# Patient Record
Sex: FEMALE | Race: OTHER | URBAN - METROPOLITAN AREA
[De-identification: names, ages, dates, MRNs, and addresses within clinical notes are randomized per-mention and may not be internally consistent; named-entity substitution may affect disease eponyms.]

---

## 2020-06-24 ENCOUNTER — TELEPHONE ENCOUNTER (OUTPATIENT)
Dept: URBAN - METROPOLITAN AREA CLINIC 25 | Facility: CLINIC | Age: 70
End: 2020-06-24

## 2020-07-06 ENCOUNTER — OFFICE VISIT (OUTPATIENT)
Dept: URBAN - METROPOLITAN AREA CLINIC 25 | Facility: CLINIC | Age: 70
End: 2020-07-06

## 2020-07-22 ENCOUNTER — OFFICE VISIT (OUTPATIENT)
Dept: URBAN - METROPOLITAN AREA CLINIC 15 | Facility: CLINIC | Age: 70
End: 2020-07-22

## 2022-07-14 ENCOUNTER — OFFICE VISIT (OUTPATIENT)
Dept: URBAN - METROPOLITAN AREA CLINIC 25 | Facility: CLINIC | Age: 72
End: 2022-07-14

## 2022-07-14 VITALS — HEIGHT: 66 IN

## 2022-07-14 RX ORDER — MAGNESIUM CARB/ALUMINUM HYDROX 105-160MG
TABLET,CHEWABLE ORAL
Qty: 0 | Refills: 0 | Status: ACTIVE | COMMUNITY
Start: 1900-01-01 | End: 1900-01-01

## 2022-07-14 RX ORDER — AMLODIPINE BESYLATE 10 MG/1
TAKE 1 TABLET (10 MG) BY ORAL ROUTE ONCE DAILY TABLET ORAL 1
Qty: 0 | Refills: 0 | Status: ACTIVE | COMMUNITY
Start: 1900-01-01 | End: 1900-01-01

## 2022-07-14 RX ORDER — INSULIN LISPRO 100 [IU]/ML
INJECT BY SUBCUTANEOUS ROUTE PER PRESCRIBER'S INSTRUCTIONS. INSULIN DOSING REQUIRES INDIVIDUALIZATION INJECTION, SOLUTION INTRAVENOUS; SUBCUTANEOUS
Qty: 1 | Refills: 0 | Status: ACTIVE | COMMUNITY
Start: 1900-01-01 | End: 1900-01-01

## 2022-07-29 ENCOUNTER — OFFICE VISIT (OUTPATIENT)
Dept: URBAN - METROPOLITAN AREA TELEHEALTH 2 | Facility: TELEHEALTH | Age: 72
End: 2022-07-29

## 2022-07-29 RX ORDER — AMLODIPINE BESYLATE 10 MG/1
TAKE 1 TABLET (10 MG) BY ORAL ROUTE ONCE DAILY TABLET ORAL 1
Qty: 0 | Refills: 0 | Status: ACTIVE | COMMUNITY
Start: 1900-01-01

## 2022-07-29 RX ORDER — MAGNESIUM CARB/ALUMINUM HYDROX 105-160MG
TABLET,CHEWABLE ORAL
Qty: 0 | Refills: 0 | Status: ACTIVE | COMMUNITY
Start: 1900-01-01

## 2022-07-29 RX ORDER — INSULIN LISPRO 100 [IU]/ML
INJECT BY SUBCUTANEOUS ROUTE PER PRESCRIBER'S INSTRUCTIONS. INSULIN DOSING REQUIRES INDIVIDUALIZATION INJECTION, SOLUTION INTRAVENOUS; SUBCUTANEOUS
Qty: 1 | Refills: 0 | Status: ACTIVE | COMMUNITY
Start: 1900-01-01

## 2022-08-01 ENCOUNTER — OFFICE VISIT (OUTPATIENT)
Dept: URBAN - METROPOLITAN AREA CLINIC 25 | Facility: CLINIC | Age: 72
End: 2022-08-01

## 2022-10-03 ENCOUNTER — OFFICE VISIT (OUTPATIENT)
Dept: URBAN - METROPOLITAN AREA CLINIC 25 | Facility: CLINIC | Age: 72
End: 2022-10-03
Payer: COMMERCIAL

## 2022-10-03 VITALS
HEART RATE: 76 BPM | SYSTOLIC BLOOD PRESSURE: 132 MMHG | WEIGHT: 159 LBS | TEMPERATURE: 98.6 F | BODY MASS INDEX: 25.55 KG/M2 | DIASTOLIC BLOOD PRESSURE: 75 MMHG | HEIGHT: 66 IN

## 2022-10-03 DIAGNOSIS — E11.9 TYPE 2 DIABETES MELLITUS WITHOUT COMPLICATIONS: ICD-10-CM

## 2022-10-03 DIAGNOSIS — I10 HYPERTENSION, UNSPECIFIED TYPE: ICD-10-CM

## 2022-10-03 DIAGNOSIS — Z79.4 LONG TERM (CURRENT) USE OF INSULIN: ICD-10-CM

## 2022-10-03 DIAGNOSIS — R19.7 DIARRHEA, UNSPECIFIED TYPE: ICD-10-CM

## 2022-10-03 DIAGNOSIS — R19.4 CHANGE IN BOWEL HABITS: ICD-10-CM

## 2022-10-03 DIAGNOSIS — K92.1 HEMATOCHEZIA: ICD-10-CM

## 2022-10-03 DIAGNOSIS — R14.0 ABDOMINAL BLOATING: ICD-10-CM

## 2022-10-03 PROBLEM — 313436004: Status: ACTIVE | Noted: 2022-10-03

## 2022-10-03 PROBLEM — 710815001: Status: ACTIVE | Noted: 2022-10-03

## 2022-10-03 PROBLEM — 38341003: Status: ACTIVE | Noted: 2022-10-03

## 2022-10-03 PROCEDURE — 99214 OFFICE O/P EST MOD 30 MIN: CPT | Performed by: INTERNAL MEDICINE

## 2022-10-03 RX ORDER — INSULIN LISPRO 100 [IU]/ML
INJECT BY SUBCUTANEOUS ROUTE PER PRESCRIBER'S INSTRUCTIONS. INSULIN DOSING REQUIRES INDIVIDUALIZATION INJECTION, SOLUTION INTRAVENOUS; SUBCUTANEOUS
Qty: 1 | Refills: 0 | Status: ACTIVE | COMMUNITY
Start: 1900-01-01

## 2022-10-03 RX ORDER — AMLODIPINE BESYLATE 10 MG/1
TAKE 1 TABLET (10 MG) BY ORAL ROUTE ONCE DAILY TABLET ORAL 1
Qty: 0 | Refills: 0 | Status: ACTIVE | COMMUNITY
Start: 1900-01-01

## 2022-10-03 RX ORDER — MESALAMINE 375 MG/1
4 CAPSULES IN THE MORNING CAPSULE, EXTENDED RELEASE ORAL ONCE A DAY
Qty: 360 CAPSULE | Refills: 1 | OUTPATIENT
Start: 2022-10-03 | End: 2023-04-01

## 2022-10-03 RX ORDER — MAGNESIUM CARB/ALUMINUM HYDROX 105-160MG
TABLET,CHEWABLE ORAL
Qty: 0 | Refills: 0 | Status: ACTIVE | COMMUNITY
Start: 1900-01-01

## 2022-10-04 LAB
A/G RATIO: 1.3
ABSOLUTE BASOPHILS: 31
ABSOLUTE EOSINOPHILS: 149
ABSOLUTE LYMPHOCYTES: 2114
ABSOLUTE MONOCYTES: 632
ABSOLUTE NEUTROPHILS: 3274
ALBUMIN: 3.9
ALKALINE PHOSPHATASE: 95
ALT (SGPT): 11
AST (SGOT): 14
BASOPHILS: 0.5
BILIRUBIN, TOTAL: 0.4
BUN/CREATININE RATIO: (no result)
BUN: 9
CALCIUM: 10.1
CARBON DIOXIDE, TOTAL: 23
CHLORIDE: 105
CREATININE: 0.68
EGFR: 92
EOSINOPHILS: 2.4
GLOBULIN, TOTAL: 3
GLUCOSE: 181
HEMATOCRIT: 41.3
HEMOGLOBIN: 13.4
LYMPHOCYTES: 34.1
MCH: 29.3
MCHC: 32.4
MCV: 90.2
MONOCYTES: 10.2
MPV: 10.7
NEUTROPHILS: 52.8
PLATELET COUNT: 286
POTASSIUM: 4
PROTEIN, TOTAL: 6.9
RDW: 12.8
RED BLOOD CELL COUNT: 4.58
SODIUM: 137
WHITE BLOOD CELL COUNT: 6.2

## 2022-10-12 PROBLEM — 62315008: Status: ACTIVE | Noted: 2022-10-12

## 2022-10-12 PROBLEM — 449341000124102: Status: ACTIVE | Noted: 2022-10-12

## 2022-10-12 PROBLEM — 129851009: Status: ACTIVE | Noted: 2022-10-12

## 2022-10-21 ENCOUNTER — OFFICE VISIT (OUTPATIENT)
Dept: URBAN - METROPOLITAN AREA SURGERY CENTER 20 | Facility: SURGERY CENTER | Age: 72
End: 2022-10-21

## 2022-10-25 ENCOUNTER — OFFICE VISIT (OUTPATIENT)
Dept: URBAN - METROPOLITAN AREA SURGERY CENTER 20 | Facility: SURGERY CENTER | Age: 72
End: 2022-10-25
Payer: COMMERCIAL

## 2022-10-25 ENCOUNTER — CLAIMS CREATED FROM THE CLAIM WINDOW (OUTPATIENT)
Dept: URBAN - METROPOLITAN AREA CLINIC 4 | Facility: CLINIC | Age: 72
End: 2022-10-25
Payer: COMMERCIAL

## 2022-10-25 DIAGNOSIS — K51.80 CHRONIC PANCOLONIC ULCERATIVE COLITIS: ICD-10-CM

## 2022-10-25 DIAGNOSIS — K63.89 OTHER SPECIFIED DISEASES OF INTESTINE: ICD-10-CM

## 2022-10-25 PROCEDURE — G8907 PT DOC NO EVENTS ON DISCHARG: HCPCS | Performed by: INTERNAL MEDICINE

## 2022-10-25 PROCEDURE — 45380 COLONOSCOPY AND BIOPSY: CPT | Performed by: INTERNAL MEDICINE

## 2022-10-25 PROCEDURE — 88305 TISSUE EXAM BY PATHOLOGIST: CPT | Performed by: PATHOLOGY

## 2022-10-25 RX ORDER — MAGNESIUM CARB/ALUMINUM HYDROX 105-160MG
TABLET,CHEWABLE ORAL
Qty: 0 | Refills: 0 | Status: ACTIVE | COMMUNITY
Start: 1900-01-01

## 2022-10-25 RX ORDER — AMLODIPINE BESYLATE 10 MG/1
TAKE 1 TABLET (10 MG) BY ORAL ROUTE ONCE DAILY TABLET ORAL 1
Qty: 0 | Refills: 0 | Status: ACTIVE | COMMUNITY
Start: 1900-01-01

## 2022-10-25 RX ORDER — INSULIN LISPRO 100 [IU]/ML
INJECT BY SUBCUTANEOUS ROUTE PER PRESCRIBER'S INSTRUCTIONS. INSULIN DOSING REQUIRES INDIVIDUALIZATION INJECTION, SOLUTION INTRAVENOUS; SUBCUTANEOUS
Qty: 1 | Refills: 0 | Status: ACTIVE | COMMUNITY
Start: 1900-01-01

## 2022-10-25 RX ORDER — MESALAMINE 375 MG/1
4 CAPSULES IN THE MORNING CAPSULE, EXTENDED RELEASE ORAL ONCE A DAY
Qty: 360 CAPSULE | Refills: 1 | Status: ACTIVE | COMMUNITY
Start: 2022-10-03 | End: 2023-04-01

## 2022-10-26 ENCOUNTER — TELEPHONE ENCOUNTER (OUTPATIENT)
Dept: URBAN - METROPOLITAN AREA CLINIC 84 | Facility: CLINIC | Age: 72
End: 2022-10-26

## 2023-07-03 ENCOUNTER — TELEPHONE ENCOUNTER (OUTPATIENT)
Dept: URBAN - METROPOLITAN AREA CLINIC 25 | Facility: CLINIC | Age: 73
End: 2023-07-03

## 2023-07-24 ENCOUNTER — OFFICE VISIT (OUTPATIENT)
Dept: URBAN - METROPOLITAN AREA CLINIC 25 | Facility: CLINIC | Age: 73
End: 2023-07-24
Payer: COMMERCIAL

## 2023-07-24 ENCOUNTER — LAB OUTSIDE AN ENCOUNTER (OUTPATIENT)
Dept: URBAN - METROPOLITAN AREA CLINIC 84 | Facility: CLINIC | Age: 73
End: 2023-07-24

## 2023-07-24 ENCOUNTER — WEB ENCOUNTER (OUTPATIENT)
Dept: URBAN - METROPOLITAN AREA CLINIC 25 | Facility: CLINIC | Age: 73
End: 2023-07-24

## 2023-07-24 VITALS
TEMPERATURE: 98.2 F | SYSTOLIC BLOOD PRESSURE: 173 MMHG | WEIGHT: 162 LBS | HEIGHT: 66 IN | BODY MASS INDEX: 26.03 KG/M2 | HEART RATE: 72 BPM | DIASTOLIC BLOOD PRESSURE: 92 MMHG

## 2023-07-24 DIAGNOSIS — K51.80 OTHER ULCERATIVE COLITIS: ICD-10-CM

## 2023-07-24 DIAGNOSIS — K92.1 HEMATOCHEZIA: ICD-10-CM

## 2023-07-24 DIAGNOSIS — K57.30 DIVERTICULOSIS OF COLON: ICD-10-CM

## 2023-07-24 DIAGNOSIS — E11.9 TYPE 2 DIABETES MELLITUS WITHOUT COMPLICATIONS: ICD-10-CM

## 2023-07-24 PROBLEM — 64766004: Status: ACTIVE | Noted: 2023-07-24

## 2023-07-24 PROBLEM — 733657002: Status: ACTIVE | Noted: 2023-07-24

## 2023-07-24 PROCEDURE — 99214 OFFICE O/P EST MOD 30 MIN: CPT | Performed by: INTERNAL MEDICINE

## 2023-07-24 RX ORDER — MAGNESIUM CARB/ALUMINUM HYDROX 105-160MG
TABLET,CHEWABLE ORAL
Qty: 0 | Refills: 0 | Status: ACTIVE | COMMUNITY
Start: 1900-01-01

## 2023-07-24 RX ORDER — INSULIN LISPRO 100 [IU]/ML
INJECT BY SUBCUTANEOUS ROUTE PER PRESCRIBER'S INSTRUCTIONS. INSULIN DOSING REQUIRES INDIVIDUALIZATION INJECTION, SOLUTION INTRAVENOUS; SUBCUTANEOUS
Qty: 1 | Refills: 0 | Status: ACTIVE | COMMUNITY
Start: 1900-01-01

## 2023-07-24 RX ORDER — MESALAMINE 4 G/60ML
AS DIRECTED SUSPENSION RECTAL AT BEDTIME
Qty: 30 | Refills: 1 | OUTPATIENT
Start: 2023-07-24 | End: 2023-09-22

## 2023-07-24 RX ORDER — AMLODIPINE BESYLATE 10 MG/1
TAKE 1 TABLET (10 MG) BY ORAL ROUTE ONCE DAILY TABLET ORAL 1
Qty: 0 | Refills: 0 | Status: ACTIVE | COMMUNITY
Start: 1900-01-01

## 2023-07-25 LAB
A/G RATIO: 1.3
ABSOLUTE BASOPHILS: 61
ABSOLUTE EOSINOPHILS: 228
ABSOLUTE LYMPHOCYTES: 1953
ABSOLUTE MONOCYTES: 578
ABSOLUTE NEUTROPHILS: 4780
ALBUMIN: 4.1
ALKALINE PHOSPHATASE: 92
ALT (SGPT): 16
AST (SGOT): 14
BASOPHILS: 0.8
BILIRUBIN, TOTAL: 0.7
BUN/CREATININE RATIO: 15
BUN: 8
CALCIUM: 10.1
CARBON DIOXIDE, TOTAL: 26
CHLORIDE: 104
CREATININE: 0.55
EGFR: 96
EOSINOPHILS: 3
GLOBULIN, TOTAL: 3.1
GLUCOSE: 163
HEMATOCRIT: 41.6
HEMOGLOBIN: 13.8
HS CRP: 8.1
LYMPHOCYTES: 25.7
MCH: 29.6
MCHC: 33.2
MCV: 89.1
MONOCYTES: 7.6
MPV: 10.2
NEUTROPHILS: 62.9
PLATELET COUNT: 394
POTASSIUM: 4
PROTEIN, TOTAL: 7.2
RDW: 12.8
RED BLOOD CELL COUNT: 4.67
SODIUM: 139
WHITE BLOOD CELL COUNT: 7.6

## 2023-07-31 ENCOUNTER — TELEPHONE ENCOUNTER (OUTPATIENT)
Dept: URBAN - METROPOLITAN AREA CLINIC 25 | Facility: CLINIC | Age: 73
End: 2023-07-31

## 2023-10-23 ENCOUNTER — OFFICE VISIT (OUTPATIENT)
Dept: URBAN - METROPOLITAN AREA CLINIC 25 | Facility: CLINIC | Age: 73
End: 2023-10-23

## 2024-01-18 ENCOUNTER — OFFICE VISIT (OUTPATIENT)
Dept: URBAN - METROPOLITAN AREA CLINIC 25 | Facility: CLINIC | Age: 74
End: 2024-01-18
Payer: COMMERCIAL

## 2024-01-18 ENCOUNTER — DASHBOARD ENCOUNTERS (OUTPATIENT)
Age: 74
End: 2024-01-18

## 2024-01-18 VITALS
SYSTOLIC BLOOD PRESSURE: 152 MMHG | WEIGHT: 164 LBS | BODY MASS INDEX: 26.36 KG/M2 | HEART RATE: 66 BPM | TEMPERATURE: 97.3 F | DIASTOLIC BLOOD PRESSURE: 77 MMHG | HEIGHT: 66 IN

## 2024-01-18 DIAGNOSIS — R19.4 CHANGE IN BOWEL HABITS: ICD-10-CM

## 2024-01-18 DIAGNOSIS — K59.04 CHRONIC IDIOPATHIC CONSTIPATION: ICD-10-CM

## 2024-01-18 DIAGNOSIS — K51.30 ULCERATIVE RECTOSIGMOIDITIS WITHOUT COMPLICATION: ICD-10-CM

## 2024-01-18 DIAGNOSIS — R10.31 RLQ DISCOMFORT: ICD-10-CM

## 2024-01-18 DIAGNOSIS — K64.4 HEMORRHOIDS, EXTERNAL: ICD-10-CM

## 2024-01-18 PROBLEM — 82934008: Status: ACTIVE | Noted: 2024-01-18

## 2024-01-18 PROBLEM — 41364008: Status: ACTIVE | Noted: 2024-01-18

## 2024-01-18 PROCEDURE — 99214 OFFICE O/P EST MOD 30 MIN: CPT

## 2024-01-18 RX ORDER — INSULIN LISPRO 100 [IU]/ML
INJECT BY SUBCUTANEOUS ROUTE PER PRESCRIBER'S INSTRUCTIONS. INSULIN DOSING REQUIRES INDIVIDUALIZATION INJECTION, SOLUTION INTRAVENOUS; SUBCUTANEOUS
Qty: 1 | Refills: 0 | Status: ACTIVE | COMMUNITY
Start: 1900-01-01

## 2024-01-18 RX ORDER — AMLODIPINE BESYLATE 10 MG/1
TAKE 1 TABLET (10 MG) BY ORAL ROUTE ONCE DAILY TABLET ORAL 1
Qty: 0 | Refills: 0 | Status: ACTIVE | COMMUNITY
Start: 1900-01-01

## 2024-01-18 RX ORDER — MAGNESIUM CARB/ALUMINUM HYDROX 105-160MG
TABLET,CHEWABLE ORAL
Qty: 0 | Refills: 0 | Status: ACTIVE | COMMUNITY
Start: 1900-01-01

## 2024-01-18 RX ORDER — LINACLOTIDE 145 UG/1
1 CAPSULE AT LEAST 30 MINUTES BEFORE THE FIRST MEAL OF THE DAY ON AN EMPTY STOMACH CAPSULE, GELATIN COATED ORAL ONCE A DAY
Qty: 90 | Refills: 1 | OUTPATIENT
Start: 2024-01-18 | End: 2024-07-16

## 2024-01-18 NOTE — HPI-TODAY'S VISIT:
01/24 OV  Patient presents for f/u for UC, CRP elevated at 8.1 from 07/23 labs, following with Dr. Sams, started on Rowasa suppositories, colonoscopy from 2022 revealed chronic colitis 2' to UC, she presents today w/ complaints of abdominal pain in the RLQ, abdominal pain presented over the last 3 weeks, CT a/p shows moderate urinary bladder wall thickening, she was treated for a UTI with omnicef, flomax, and Toradol. CT also showed mild hepatomegaly and a large amount of retained stool was seen in the colon  (+) RLQ abdominal pain  - Pain feels like a sharp superficial stabbing pain from the naval to the right side, better w/ BM's, the patient has been taking dulcolax and magnesium powder which has helped her move and to decrease her discomfort  - Patient also reports she is having hard pebble-like stools, and when she is emptied out she will feel relief of her abdominal pain  - Patient continues to have BRBPR w/ mucus in her stool, which occurs sporadically, she notes hemorrhoids in the area as well  - Patient notes she will not have a BM w/o any medication, notes constipation has gotten significant, taking large amounts of calm and dulcolax  - Patient deneis melena, unintentional weight loss, change in appetite, jaundice, icterus, fatigue,

## 2024-01-19 ENCOUNTER — WEB ENCOUNTER (OUTPATIENT)
Dept: URBAN - METROPOLITAN AREA CLINIC 84 | Facility: CLINIC | Age: 74
End: 2024-01-19

## 2024-01-25 ENCOUNTER — TELEPHONE ENCOUNTER (OUTPATIENT)
Dept: URBAN - METROPOLITAN AREA CLINIC 84 | Facility: CLINIC | Age: 74
End: 2024-01-25

## 2024-01-25 LAB — CALPROTECTIN, STOOL - QDX: (no result)

## 2024-01-26 ENCOUNTER — TELEPHONE ENCOUNTER (OUTPATIENT)
Dept: URBAN - METROPOLITAN AREA CLINIC 25 | Facility: CLINIC | Age: 74
End: 2024-01-26

## 2024-01-26 RX ORDER — MESALAMINE 1000 MG/1
1 SUPPOSITORY AT BEDTIME AND LEAVE SUPPOSITORY IN FOR 1-3 HOURS SUPPOSITORY RECTAL ONCE
Qty: 90 | Refills: 1 | OUTPATIENT
Start: 2024-01-30 | End: 2024-07-28

## 2024-03-20 NOTE — PHYSICAL EXAM NEUROLOGIC:
Spoke with patient.  Patient states that she began having increase in knee pain she is unsure as to any specific causation for the increase in symptomatology.  I do have follow-up visit with patient on March 22, 2024.  I instructed patient that restrictions will be updated for her to perform sit-down work only.  Patient was explained ice protocol.  If symptomatology persists worsens  she is to present to urgent care/ER.  She asked that the updated restrictions be emailed to her so she can provide this to her employer.  [email or web address]   oriented to person, place and time

## 2024-03-21 ENCOUNTER — OV EP (OUTPATIENT)
Dept: URBAN - METROPOLITAN AREA CLINIC 25 | Facility: CLINIC | Age: 74
End: 2024-03-21

## 2024-09-10 ENCOUNTER — TELEPHONE ENCOUNTER (OUTPATIENT)
Dept: URBAN - METROPOLITAN AREA CLINIC 25 | Facility: CLINIC | Age: 74
End: 2024-09-10

## 2024-09-10 RX ORDER — LINACLOTIDE 145 UG/1
1 CAPSULE AT LEAST 30 MINUTES BEFORE THE FIRST MEAL OF THE DAY ON AN EMPTY STOMACH CAPSULE, GELATIN COATED ORAL ONCE A DAY
Qty: 90 | Refills: 1 | OUTPATIENT
Start: 2024-09-10 | End: 2025-03-08

## 2025-03-28 ENCOUNTER — TELEPHONE ENCOUNTER (OUTPATIENT)
Dept: URBAN - METROPOLITAN AREA CLINIC 25 | Facility: CLINIC | Age: 75
End: 2025-03-28